# Patient Record
Sex: MALE | Race: WHITE | NOT HISPANIC OR LATINO | Employment: OTHER | URBAN - METROPOLITAN AREA
[De-identification: names, ages, dates, MRNs, and addresses within clinical notes are randomized per-mention and may not be internally consistent; named-entity substitution may affect disease eponyms.]

---

## 2017-02-16 NOTE — PATIENT DISCUSSION
CATARACTS, OU: VISUALLY SIGNIFICANT. OPTION OF SURGERY VERSUS FOLLOWING VERSUS UPDATING GLASSES DISCUSSED. RBA'S DISCUSSED, PATIENT UNDERSTANDS AND DESIRES SURGERY TO INCREASE VISION FOR READING,WATCHING TV AND GLARE FROM [place]. SCHEDULE CATARACT SURGERY/PRE-OP OU.

## 2017-05-03 NOTE — PATIENT DISCUSSION
Post-Op Instructions: The patient was instructed in the proper use of post-operative eye drops: Pred-Gati-Nepaf (1%/0.5%/0.1%) 2 times per day for 1 week, then reduce to 1 time per day for 1 week, then discontinue unless otherwise instructed.

## 2018-06-21 NOTE — PATIENT DISCUSSION
POSTERIOR CAPSULAR FIBROSIS, OU:  VISUALLY SIGNIFICANT. OPTION OF YAG LASER VERSUS UPDATING GLASSES VERSUS FOLLOWING DISCUSSED. RBA'S DISCUSSED, PATIENT UNDERSTANDS AND DESIRES YAG LASER TO INCREASE VISION FOR READING AND WATCHING TELEVISION. SCHEDULE YAG LASER OU.

## 2021-04-16 ENCOUNTER — NEW PATIENT COMPREHENSIVE (OUTPATIENT)
Dept: URBAN - METROPOLITAN AREA CLINIC 39 | Facility: CLINIC | Age: 61
End: 2021-04-16

## 2021-04-16 DIAGNOSIS — H52.03: ICD-10-CM

## 2021-04-16 DIAGNOSIS — H52.203: ICD-10-CM

## 2021-04-16 DIAGNOSIS — H52.4: ICD-10-CM

## 2021-04-16 PROCEDURE — 92004 COMPRE OPH EXAM NEW PT 1/>: CPT

## 2021-04-16 PROCEDURE — 92015 DETERMINE REFRACTIVE STATE: CPT

## 2021-04-16 ASSESSMENT — TONOMETRY
OD_IOP_MMHG: 16
OS_IOP_MMHG: 16

## 2021-04-16 ASSESSMENT — VISUAL ACUITY
OS_CC: 20/25-1
OS_SC: 20/25-1
OD_SC: 20/30-1
OS_CC: J2
OD_CC: J6
OD_CC: 20/25+2
OD_SC: J12
OS_SC: J12

## 2022-04-19 ENCOUNTER — COMPREHENSIVE EXAM (OUTPATIENT)
Dept: URBAN - METROPOLITAN AREA CLINIC 39 | Facility: CLINIC | Age: 62
End: 2022-04-19

## 2022-04-19 DIAGNOSIS — H52.03: ICD-10-CM

## 2022-04-19 DIAGNOSIS — H52.203: ICD-10-CM

## 2022-04-19 DIAGNOSIS — H52.4: ICD-10-CM

## 2022-04-19 PROCEDURE — 92014 COMPRE OPH EXAM EST PT 1/>: CPT

## 2022-04-19 PROCEDURE — 92015 DETERMINE REFRACTIVE STATE: CPT

## 2022-04-19 ASSESSMENT — VISUAL ACUITY
OU_SC: 20/25-2
OS_SC: 20/30
OD_SC: J8
OU_SC: J5
OD_CC: J1+
OS_SC: J6
OU_CC: J1+
OD_SC: 20/30-1
OS_CC: J1+

## 2022-04-19 ASSESSMENT — TONOMETRY
OS_IOP_MMHG: 12
OD_IOP_MMHG: 15

## 2023-04-20 ENCOUNTER — COMPREHENSIVE EXAM (OUTPATIENT)
Dept: URBAN - METROPOLITAN AREA CLINIC 39 | Facility: CLINIC | Age: 63
End: 2023-04-20

## 2023-04-20 DIAGNOSIS — H52.03: ICD-10-CM

## 2023-04-20 DIAGNOSIS — H52.203: ICD-10-CM

## 2023-04-20 DIAGNOSIS — H52.4: ICD-10-CM

## 2023-04-20 PROCEDURE — 92014 COMPRE OPH EXAM EST PT 1/>: CPT

## 2023-04-20 PROCEDURE — 92015 DETERMINE REFRACTIVE STATE: CPT

## 2023-04-20 ASSESSMENT — TONOMETRY
OS_IOP_MMHG: 15
OD_IOP_MMHG: 14

## 2023-04-20 ASSESSMENT — VISUAL ACUITY
OD_SC: 20/20-1
OD_SC: J16
OU_SC: J5
OS_SC: 20/20
OS_SC: J10
OU_SC: 20/20

## 2024-06-14 ENCOUNTER — COMPREHENSIVE EXAM (OUTPATIENT)
Dept: URBAN - METROPOLITAN AREA CLINIC 39 | Facility: CLINIC | Age: 64
End: 2024-06-14

## 2025-06-16 ENCOUNTER — COMPREHENSIVE EXAM (OUTPATIENT)
Age: 65
End: 2025-06-16

## 2025-06-16 DIAGNOSIS — H52.03: ICD-10-CM

## 2025-06-16 DIAGNOSIS — H52.203: ICD-10-CM

## 2025-06-16 DIAGNOSIS — H52.4: ICD-10-CM

## 2025-06-16 PROCEDURE — 92015 DETERMINE REFRACTIVE STATE: CPT

## 2025-06-16 PROCEDURE — 92014 COMPRE OPH EXAM EST PT 1/>: CPT
